# Patient Record
Sex: FEMALE | Race: WHITE | NOT HISPANIC OR LATINO
[De-identification: names, ages, dates, MRNs, and addresses within clinical notes are randomized per-mention and may not be internally consistent; named-entity substitution may affect disease eponyms.]

---

## 2020-03-18 PROBLEM — Z00.00 ENCOUNTER FOR PREVENTIVE HEALTH EXAMINATION: Status: ACTIVE | Noted: 2020-03-18

## 2020-03-19 ENCOUNTER — APPOINTMENT (OUTPATIENT)
Dept: COLORECTAL SURGERY | Facility: CLINIC | Age: 75
End: 2020-03-19
Payer: MEDICARE

## 2020-03-23 ENCOUNTER — APPOINTMENT (OUTPATIENT)
Dept: COLORECTAL SURGERY | Facility: CLINIC | Age: 75
End: 2020-03-23
Payer: MEDICARE

## 2020-03-23 ENCOUNTER — APPOINTMENT (OUTPATIENT)
Dept: COLORECTAL SURGERY | Facility: CLINIC | Age: 75
End: 2020-03-23

## 2020-03-23 VITALS
HEART RATE: 89 BPM | DIASTOLIC BLOOD PRESSURE: 83 MMHG | SYSTOLIC BLOOD PRESSURE: 138 MMHG | WEIGHT: 114 LBS | HEIGHT: 60 IN | BODY MASS INDEX: 22.38 KG/M2 | TEMPERATURE: 97.7 F

## 2020-03-23 DIAGNOSIS — K57.90 DIVERTICULOSIS OF INTESTINE, PART UNSPECIFIED, W/OUT PERFORATION OR ABSCESS W/OUT BLEEDING: ICD-10-CM

## 2020-03-23 DIAGNOSIS — K62.3 RECTAL PROLAPSE: ICD-10-CM

## 2020-03-23 DIAGNOSIS — Z87.39 PERSONAL HISTORY OF OTHER DISEASES OF THE MUSCULOSKELETAL SYSTEM AND CONNECTIVE TISSUE: ICD-10-CM

## 2020-03-23 DIAGNOSIS — K59.09 OTHER CONSTIPATION: ICD-10-CM

## 2020-03-23 PROCEDURE — 99203 OFFICE O/P NEW LOW 30 MIN: CPT

## 2020-03-23 NOTE — CONSULT LETTER
[Dear  ___] : Dear  [unfilled], [Consult Letter:] : I had the pleasure of evaluating your patient, [unfilled]. [Please see my note below.] : Please see my note below. [Consult Closing:] : Thank you very much for allowing me to participate in the care of this patient.  If you have any questions, please do not hesitate to contact me. [Sincerely,] : Sincerely, [FreeTextEntry3] : Aicha Carrillo MD\par

## 2020-03-23 NOTE — PHYSICAL EXAM
[Normal rectal exam] : exam was normal [None] : no anal fissures seen [No Rash or Lesion] : No rash or lesion [Alert] : alert [Oriented to Person] : oriented to person [Oriented to Place] : oriented to place [Oriented to Time] : oriented to time [Calm] : calm [de-identified] : soft, NTND [de-identified] : rectal prolapse, 2 cm noted [de-identified] : NAD [de-identified] : NCAT [de-identified] : PARKER x 4

## 2020-03-23 NOTE — HISTORY OF PRESENT ILLNESS
[FreeTextEntry1] : Ms. Almaraz presents to the office with reports of worsening rectal prolapse associated with constipation. Rectal prolapse was initially noted ~ 1 yr earlier. Over the last month, found that the prolapse is increasing in size and frequency. Prolapse occurs every day when attempting to defecate. Does not occur with cough, sneeze or ambulation. Is always manually reducible, no issues with incarceration. Occasional blood noted.\par BMs are typically passed every 3 days and require straining to evacuate. Since she underwent a colonoscopy 1 week earlier, has been using metamucil daily with good effect. Over the last 2 days, has had BMs daily without much effort though prolapse still occurs. \par Here for surgical consultation as prolapse is affecting quality of life.

## 2020-05-21 ENCOUNTER — OUTPATIENT (OUTPATIENT)
Dept: OUTPATIENT SERVICES | Facility: HOSPITAL | Age: 75
LOS: 1 days | End: 2020-05-21
Payer: MEDICARE

## 2020-05-21 ENCOUNTER — RESULT REVIEW (OUTPATIENT)
Age: 75
End: 2020-05-21

## 2020-05-21 VITALS
WEIGHT: 115.08 LBS | HEIGHT: 58.5 IN | OXYGEN SATURATION: 100 % | SYSTOLIC BLOOD PRESSURE: 145 MMHG | TEMPERATURE: 98 F | DIASTOLIC BLOOD PRESSURE: 82 MMHG | RESPIRATION RATE: 18 BRPM | HEART RATE: 95 BPM

## 2020-05-21 DIAGNOSIS — Z29.9 ENCOUNTER FOR PROPHYLACTIC MEASURES, UNSPECIFIED: ICD-10-CM

## 2020-05-21 DIAGNOSIS — Z01.818 ENCOUNTER FOR OTHER PREPROCEDURAL EXAMINATION: ICD-10-CM

## 2020-05-21 LAB
ANION GAP SERPL CALC-SCNC: 5 MMOL/L — SIGNIFICANT CHANGE UP (ref 5–17)
APTT BLD: 33.7 SEC — SIGNIFICANT CHANGE UP (ref 27.5–36.3)
BASOPHILS # BLD AUTO: 0.04 K/UL — SIGNIFICANT CHANGE UP (ref 0–0.2)
BASOPHILS NFR BLD AUTO: 0.4 % — SIGNIFICANT CHANGE UP (ref 0–2)
BUN SERPL-MCNC: 9 MG/DL — SIGNIFICANT CHANGE UP (ref 7–23)
CALCIUM SERPL-MCNC: 9.7 MG/DL — SIGNIFICANT CHANGE UP (ref 8.5–10.1)
CHLORIDE SERPL-SCNC: 100 MMOL/L — SIGNIFICANT CHANGE UP (ref 96–108)
CO2 SERPL-SCNC: 29 MMOL/L — SIGNIFICANT CHANGE UP (ref 22–31)
CREAT SERPL-MCNC: 0.63 MG/DL — SIGNIFICANT CHANGE UP (ref 0.5–1.3)
EOSINOPHIL # BLD AUTO: 0.07 K/UL — SIGNIFICANT CHANGE UP (ref 0–0.5)
EOSINOPHIL NFR BLD AUTO: 0.7 % — SIGNIFICANT CHANGE UP (ref 0–6)
GLUCOSE SERPL-MCNC: 109 MG/DL — HIGH (ref 70–99)
HCT VFR BLD CALC: 40 % — SIGNIFICANT CHANGE UP (ref 34.5–45)
HGB BLD-MCNC: 13.6 G/DL — SIGNIFICANT CHANGE UP (ref 11.5–15.5)
IMM GRANULOCYTES NFR BLD AUTO: 0.4 % — SIGNIFICANT CHANGE UP (ref 0–1.5)
INR BLD: 1.05 RATIO — SIGNIFICANT CHANGE UP (ref 0.88–1.16)
LYMPHOCYTES # BLD AUTO: 1.17 K/UL — SIGNIFICANT CHANGE UP (ref 1–3.3)
LYMPHOCYTES # BLD AUTO: 11.1 % — LOW (ref 13–44)
MCHC RBC-ENTMCNC: 29 PG — SIGNIFICANT CHANGE UP (ref 27–34)
MCHC RBC-ENTMCNC: 34 GM/DL — SIGNIFICANT CHANGE UP (ref 32–36)
MCV RBC AUTO: 85.3 FL — SIGNIFICANT CHANGE UP (ref 80–100)
MONOCYTES # BLD AUTO: 0.87 K/UL — SIGNIFICANT CHANGE UP (ref 0–0.9)
MONOCYTES NFR BLD AUTO: 8.2 % — SIGNIFICANT CHANGE UP (ref 2–14)
NEUTROPHILS # BLD AUTO: 8.38 K/UL — HIGH (ref 1.8–7.4)
NEUTROPHILS NFR BLD AUTO: 79.2 % — HIGH (ref 43–77)
PLATELET # BLD AUTO: 325 K/UL — SIGNIFICANT CHANGE UP (ref 150–400)
POTASSIUM SERPL-MCNC: 5 MMOL/L — SIGNIFICANT CHANGE UP (ref 3.5–5.3)
POTASSIUM SERPL-SCNC: 5 MMOL/L — SIGNIFICANT CHANGE UP (ref 3.5–5.3)
PROTHROM AB SERPL-ACNC: 11.7 SEC — SIGNIFICANT CHANGE UP (ref 10–12.9)
RBC # BLD: 4.69 M/UL — SIGNIFICANT CHANGE UP (ref 3.8–5.2)
RBC # FLD: 13 % — SIGNIFICANT CHANGE UP (ref 10.3–14.5)
SODIUM SERPL-SCNC: 134 MMOL/L — LOW (ref 135–145)
WBC # BLD: 10.57 K/UL — HIGH (ref 3.8–10.5)
WBC # FLD AUTO: 10.57 K/UL — HIGH (ref 3.8–10.5)

## 2020-05-21 PROCEDURE — 80048 BASIC METABOLIC PNL TOTAL CA: CPT

## 2020-05-21 PROCEDURE — 93005 ELECTROCARDIOGRAM TRACING: CPT

## 2020-05-21 PROCEDURE — 86850 RBC ANTIBODY SCREEN: CPT

## 2020-05-21 PROCEDURE — 85610 PROTHROMBIN TIME: CPT

## 2020-05-21 PROCEDURE — 86900 BLOOD TYPING SEROLOGIC ABO: CPT

## 2020-05-21 PROCEDURE — 71046 X-RAY EXAM CHEST 2 VIEWS: CPT | Mod: 26

## 2020-05-21 PROCEDURE — 36415 COLL VENOUS BLD VENIPUNCTURE: CPT

## 2020-05-21 PROCEDURE — 86901 BLOOD TYPING SEROLOGIC RH(D): CPT

## 2020-05-21 PROCEDURE — 93010 ELECTROCARDIOGRAM REPORT: CPT

## 2020-05-21 PROCEDURE — 85025 COMPLETE CBC W/AUTO DIFF WBC: CPT

## 2020-05-21 PROCEDURE — 85730 THROMBOPLASTIN TIME PARTIAL: CPT

## 2020-05-21 PROCEDURE — 71046 X-RAY EXAM CHEST 2 VIEWS: CPT

## 2020-05-21 PROCEDURE — G0463: CPT | Mod: 25

## 2020-05-21 NOTE — H&P PST ADULT - ASSESSMENT
74 y/o female with rectal prolapsed. Scheduled for Laparoscopic possible open resection rectopexy.   Plan  1. Stop all NSAIDS, herbal supplements and vitamins for 7 days.  2. NPO as per ASU instructions.  3. Use EZ sponges as directed  4. Labs, EKG, CXR as per surgeon  5. PMD visit for optimization prior to surgery as per surgeon  6. COVID test ordered for 2020. Pt instructed to return to [facility] for COVID test.  7. Continue to quarantine at home.     CAPRINI SCORE [CLOT]    AGE RELATED RISK FACTORS                                                       MOBILITY RELATED FACTORS  [ ] Age 41-60 years                                            (1 Point)                  [ ] Bed rest                                                        (1 Point)  [ ] Age: 61-74 years                                           (2 Points)                 [ ] Plaster cast                                                   (2 Points)  [ x ] Age= 75 years                                              (3 Points)                 [ ] Bed bound for more than 72 hours                 (2 Points)    DISEASE RELATED RISK FACTORS                                               GENDER SPECIFIC FACTORS  [ ] Edema in the lower extremities                       (1 Point)                  [ ] Pregnancy                                                     (1 Point)  [ ] Varicose veins                                               (1 Point)                  [ ] Post-partum < 6 weeks                                   (1 Point)             [ ] BMI > 25 Kg/m2                                            (1 Point)                  [ ] Hormonal therapy  or oral contraception          (1 Point)                 [ ] Sepsis (in the previous month)                        (1 Point)                  [ ] History of pregnancy complications                 (1 point)  [ ] Pneumonia or serious lung disease                                               [ ] Unexplained or recurrent                     (1 Point)           (in the previous month)                               (1 Point)  [ ] Abnormal pulmonary function test                     (1 Point)                 SURGERY RELATED RISK FACTORS  [ ] Acute myocardial infarction                              (1 Point)                 [ ]  Section                                             (1 Point)  [ ] Congestive heart failure (in the previous month)  (1 Point)               [ ] Minor surgery                                                  (1 Point)   [ ] Inflammatory bowel disease                             (1 Point)                 [ ] Arthroscopic surgery                                        (2 Points)  [ ] Central venous access                                      (2 Points)                [ x ] General surgery lasting more than 45 minutes   (2 Points)       [ ] Stroke (in the previous month)                          (5 Points)               [ ] Elective arthroplasty                                         (5 Points)     ()  malignancy                                                             (2 points )                                                                                                                                      HEMATOLOGY RELATED FACTORS                                                 TRAUMA RELATED RISK FACTORS  [ ] Prior episodes of VTE                                     (3 Points)                 [ ] Fracture of the hip, pelvis, or leg                       (5 Points)  [ ] Positive family history for VTE                         (3 Points)                 [ ] Acute spinal cord injury (in the previous month)  (5 Points)  [ ] Prothrombin 53443 A                                     (3 Points)                 [ ] Paralysis  (less than 1 month)                             (5 Points)  [ ] Factor V Leiden                                             (3 Points)                  [ ] Multiple Trauma within 1 month                        (5 Points)  [ ] Lupus anticoagulants                                     (3 Points)                                                           [ ] Anticardiolipin antibodies                               (3 Points)                                                       [ ] High homocysteine in the blood                      (3 Points)                                             [ ] Other congenital or acquired thrombophilia      (3 Points)                                                [ ] Heparin induced thrombocytopenia                  (3 Points)    (  ) Malignancy                                        Total Score [   5       ]    The Caprini score indicates this patient is at risk for a VTE event (score 3-5).  Most surgical patients in this group would benefit from pharmacologic prophylaxis.  The surgical team will determine the balance between VTE risk and bleeding risk 76 y/o female with rectal prolapse. Scheduled for Laparoscopic possible open resection rectopexy.   Plan  1. Stop all NSAIDS, herbal supplements and vitamins for 7 days.  2. NPO as per ASU instructions.  3. Use EZ sponges as directed  4. Labs, EKG, CXR as per surgeon  5. PMD visit for optimization prior to surgery as per surgeon  6. COVID test ordered for 2020. Pt instructed to return to [email or web address] for COVID test.  7. Continue to quarantine at home.     CAPRINI SCORE [CLOT]    AGE RELATED RISK FACTORS                                                       MOBILITY RELATED FACTORS  [ ] Age 41-60 years                                            (1 Point)                  [ ] Bed rest                                                        (1 Point)  [ ] Age: 61-74 years                                           (2 Points)                 [ ] Plaster cast                                                   (2 Points)  [ x ] Age= 75 years                                              (3 Points)                 [ ] Bed bound for more than 72 hours                 (2 Points)    DISEASE RELATED RISK FACTORS                                               GENDER SPECIFIC FACTORS  [ ] Edema in the lower extremities                       (1 Point)                  [ ] Pregnancy                                                     (1 Point)  [ ] Varicose veins                                               (1 Point)                  [ ] Post-partum < 6 weeks                                   (1 Point)             [ ] BMI > 25 Kg/m2                                            (1 Point)                  [ ] Hormonal therapy  or oral contraception          (1 Point)                 [ ] Sepsis (in the previous month)                        (1 Point)                  [ ] History of pregnancy complications                 (1 point)  [ ] Pneumonia or serious lung disease                                               [ ] Unexplained or recurrent                     (1 Point)           (in the previous month)                               (1 Point)  [ ] Abnormal pulmonary function test                     (1 Point)                 SURGERY RELATED RISK FACTORS  [ ] Acute myocardial infarction                              (1 Point)                 [ ]  Section                                             (1 Point)  [ ] Congestive heart failure (in the previous month)  (1 Point)               [ ] Minor surgery                                                  (1 Point)   [ ] Inflammatory bowel disease                             (1 Point)                 [ ] Arthroscopic surgery                                        (2 Points)  [ ] Central venous access                                      (2 Points)                [ x ] General surgery lasting more than 45 minutes   (2 Points)       [ ] Stroke (in the previous month)                          (5 Points)               [ ] Elective arthroplasty                                         (5 Points)     ()  malignancy                                                             (2 points )                                                                                                                                      HEMATOLOGY RELATED FACTORS                                                 TRAUMA RELATED RISK FACTORS  [ ] Prior episodes of VTE                                     (3 Points)                 [ ] Fracture of the hip, pelvis, or leg                       (5 Points)  [ ] Positive family history for VTE                         (3 Points)                 [ ] Acute spinal cord injury (in the previous month)  (5 Points)  [ ] Prothrombin 37332 A                                     (3 Points)                 [ ] Paralysis  (less than 1 month)                             (5 Points)  [ ] Factor V Leiden                                             (3 Points)                  [ ] Multiple Trauma within 1 month                        (5 Points)  [ ] Lupus anticoagulants                                     (3 Points)                                                           [ ] Anticardiolipin antibodies                               (3 Points)                                                       [ ] High homocysteine in the blood                      (3 Points)                                             [ ] Other congenital or acquired thrombophilia      (3 Points)                                                [ ] Heparin induced thrombocytopenia                  (3 Points)    (  ) Malignancy                                        Total Score [   5       ]    The Caprini score indicates this patient is at risk for a VTE event (score 3-5).  Most surgical patients in this group would benefit from pharmacologic prophylaxis.  The surgical team will determine the balance between VTE risk and bleeding risk

## 2020-05-21 NOTE — H&P PST ADULT - HISTORY OF PRESENT ILLNESS
74 y/o female with rectal prolapsed. Pt states "my rectum comes out", complain of rectal bleeding, denies pain. Scheduled for Laparoscopic possible open resection rectopexy. 74 y/o female with rectal prolapse. Pt states "my rectum comes out", complain of rectal bleeding, denies pain. Scheduled for Laparoscopic possible open resection rectopexy.

## 2020-05-22 DIAGNOSIS — Z01.818 ENCOUNTER FOR OTHER PREPROCEDURAL EXAMINATION: ICD-10-CM

## 2020-05-26 ENCOUNTER — OUTPATIENT (OUTPATIENT)
Dept: OUTPATIENT SERVICES | Facility: HOSPITAL | Age: 75
LOS: 1 days | End: 2020-05-26
Payer: MEDICARE

## 2020-05-26 DIAGNOSIS — Z11.59 ENCOUNTER FOR SCREENING FOR OTHER VIRAL DISEASES: ICD-10-CM

## 2020-05-26 PROCEDURE — U0003: CPT

## 2020-05-27 DIAGNOSIS — Z11.59 ENCOUNTER FOR SCREENING FOR OTHER VIRAL DISEASES: ICD-10-CM

## 2020-05-27 LAB — SARS-COV-2 RNA SPEC QL NAA+PROBE: DETECTED

## 2020-05-28 ENCOUNTER — APPOINTMENT (OUTPATIENT)
Dept: COLORECTAL SURGERY | Facility: HOSPITAL | Age: 75
End: 2020-05-28

## 2020-06-01 NOTE — H&P PST ADULT - BP NONINVASIVE DIASTOLIC (MM HG)
Attempted to contact patient via phone.  Left message to call back.  Does he want to , mail or fax?   82

## 2020-06-22 PROBLEM — K59.00 CONSTIPATION, UNSPECIFIED: Chronic | Status: ACTIVE | Noted: 2020-05-21

## 2020-06-22 PROBLEM — K62.3 RECTAL PROLAPSE: Chronic | Status: ACTIVE | Noted: 2020-05-21

## 2020-06-24 ENCOUNTER — OUTPATIENT (OUTPATIENT)
Dept: OUTPATIENT SERVICES | Facility: HOSPITAL | Age: 75
LOS: 1 days | End: 2020-06-24
Payer: MEDICARE

## 2020-06-24 VITALS
HEART RATE: 83 BPM | HEIGHT: 59 IN | SYSTOLIC BLOOD PRESSURE: 152 MMHG | OXYGEN SATURATION: 97 % | DIASTOLIC BLOOD PRESSURE: 77 MMHG | TEMPERATURE: 99 F | WEIGHT: 110.89 LBS | RESPIRATION RATE: 14 BRPM

## 2020-06-24 DIAGNOSIS — Z01.818 ENCOUNTER FOR OTHER PREPROCEDURAL EXAMINATION: ICD-10-CM

## 2020-06-24 DIAGNOSIS — K62.3 RECTAL PROLAPSE: ICD-10-CM

## 2020-06-24 LAB
ANION GAP SERPL CALC-SCNC: 5 MMOL/L — SIGNIFICANT CHANGE UP (ref 5–17)
APTT BLD: 32.5 SEC — SIGNIFICANT CHANGE UP (ref 27.5–36.3)
BASOPHILS # BLD AUTO: 0.03 K/UL — SIGNIFICANT CHANGE UP (ref 0–0.2)
BASOPHILS NFR BLD AUTO: 0.5 % — SIGNIFICANT CHANGE UP (ref 0–2)
BUN SERPL-MCNC: 11 MG/DL — SIGNIFICANT CHANGE UP (ref 7–23)
CALCIUM SERPL-MCNC: 9.2 MG/DL — SIGNIFICANT CHANGE UP (ref 8.5–10.1)
CHLORIDE SERPL-SCNC: 103 MMOL/L — SIGNIFICANT CHANGE UP (ref 96–108)
CO2 SERPL-SCNC: 29 MMOL/L — SIGNIFICANT CHANGE UP (ref 22–31)
CREAT SERPL-MCNC: 0.65 MG/DL — SIGNIFICANT CHANGE UP (ref 0.5–1.3)
EOSINOPHIL # BLD AUTO: 0.03 K/UL — SIGNIFICANT CHANGE UP (ref 0–0.5)
EOSINOPHIL NFR BLD AUTO: 0.5 % — SIGNIFICANT CHANGE UP (ref 0–6)
GLUCOSE SERPL-MCNC: 108 MG/DL — HIGH (ref 70–99)
HCT VFR BLD CALC: 41.6 % — SIGNIFICANT CHANGE UP (ref 34.5–45)
HGB BLD-MCNC: 14.2 G/DL — SIGNIFICANT CHANGE UP (ref 11.5–15.5)
IMM GRANULOCYTES NFR BLD AUTO: 0.2 % — SIGNIFICANT CHANGE UP (ref 0–1.5)
INR BLD: 0.99 RATIO — SIGNIFICANT CHANGE UP (ref 0.88–1.16)
LYMPHOCYTES # BLD AUTO: 0.98 K/UL — LOW (ref 1–3.3)
LYMPHOCYTES # BLD AUTO: 15.8 % — SIGNIFICANT CHANGE UP (ref 13–44)
MCHC RBC-ENTMCNC: 29.6 PG — SIGNIFICANT CHANGE UP (ref 27–34)
MCHC RBC-ENTMCNC: 34.1 GM/DL — SIGNIFICANT CHANGE UP (ref 32–36)
MCV RBC AUTO: 86.7 FL — SIGNIFICANT CHANGE UP (ref 80–100)
MONOCYTES # BLD AUTO: 0.52 K/UL — SIGNIFICANT CHANGE UP (ref 0–0.9)
MONOCYTES NFR BLD AUTO: 8.4 % — SIGNIFICANT CHANGE UP (ref 2–14)
NEUTROPHILS # BLD AUTO: 4.62 K/UL — SIGNIFICANT CHANGE UP (ref 1.8–7.4)
NEUTROPHILS NFR BLD AUTO: 74.6 % — SIGNIFICANT CHANGE UP (ref 43–77)
PLATELET # BLD AUTO: 296 K/UL — SIGNIFICANT CHANGE UP (ref 150–400)
POTASSIUM SERPL-MCNC: 4.2 MMOL/L — SIGNIFICANT CHANGE UP (ref 3.5–5.3)
POTASSIUM SERPL-SCNC: 4.2 MMOL/L — SIGNIFICANT CHANGE UP (ref 3.5–5.3)
PROTHROM AB SERPL-ACNC: 11 SEC — SIGNIFICANT CHANGE UP (ref 10–12.9)
RBC # BLD: 4.8 M/UL — SIGNIFICANT CHANGE UP (ref 3.8–5.2)
RBC # FLD: 13.3 % — SIGNIFICANT CHANGE UP (ref 10.3–14.5)
SODIUM SERPL-SCNC: 137 MMOL/L — SIGNIFICANT CHANGE UP (ref 135–145)
WBC # BLD: 6.19 K/UL — SIGNIFICANT CHANGE UP (ref 3.8–10.5)
WBC # FLD AUTO: 6.19 K/UL — SIGNIFICANT CHANGE UP (ref 3.8–10.5)

## 2020-06-24 PROCEDURE — 80048 BASIC METABOLIC PNL TOTAL CA: CPT

## 2020-06-24 PROCEDURE — G0463: CPT | Mod: 25

## 2020-06-24 PROCEDURE — 85610 PROTHROMBIN TIME: CPT

## 2020-06-24 PROCEDURE — 36415 COLL VENOUS BLD VENIPUNCTURE: CPT

## 2020-06-24 PROCEDURE — 86901 BLOOD TYPING SEROLOGIC RH(D): CPT

## 2020-06-24 PROCEDURE — 85025 COMPLETE CBC W/AUTO DIFF WBC: CPT

## 2020-06-24 PROCEDURE — 86850 RBC ANTIBODY SCREEN: CPT

## 2020-06-24 PROCEDURE — 85730 THROMBOPLASTIN TIME PARTIAL: CPT

## 2020-06-24 PROCEDURE — 86900 BLOOD TYPING SEROLOGIC ABO: CPT

## 2020-06-24 NOTE — H&P PST ADULT - HISTORY OF PRESENT ILLNESS
74 y/o female with rectal prolapse. Pt states "my rectum comes out", after having bowel movements. States she uses vaseline to "push it back up". c/o rectal bleeding after BMs. Denies rectal pain. Last PST done 5/21 but surgery cancelled d/t + COVID-19 swab.  Scheduled for Laparoscopic possible open resection rectopexy.

## 2020-06-24 NOTE — H&P PST ADULT - PRIMARY CARE PROVIDER
Dr.Mohseni, [facility]  [phone] Dr.Mohseni, Halah G.  [phone]/ Dr Teresa Cat [phone]

## 2020-06-24 NOTE — H&P PST ADULT - NSICDXPASTMEDICALHX_GEN_ALL_CORE_FT
PAST MEDICAL HISTORY:  Constipation     Rectal bleeding     Rectal prolapse PAST MEDICAL HISTORY:  Constipation     Poor historian     Rectal bleeding     Rectal prolapse

## 2020-06-24 NOTE — H&P PST ADULT - NEGATIVE CARDIOVASCULAR SYMPTOMS
no peripheral edema/no chest pain/no palpitations/no dyspnea on exertion no dyspnea on exertion/no chest pain/no palpitations

## 2020-06-24 NOTE — H&P PST ADULT - MUSCULOSKELETAL
negative No joint pain, swelling or deformity; no limitation of movement detailed exam no calf tenderness/ROM intact/normal strength

## 2020-06-24 NOTE — H&P PST ADULT - ASSESSMENT
74 y/o female with rectal prolapse. Scheduled for Laparoscopic possible open resection rectopexy.   Plan  1. Stop all NSAIDS, herbal supplements and vitamins for 7 days.  2. NPO as per ASU instructions.  3. Use EZ sponges as directed  4. Labs, EKG, CXR as per surgeon  5. PMD visit for optimization prior to surgery as per surgeon  6. COVID test ordered for 2020. Pt instructed to return to [facility] for COVID test.  7. Continue to quarantine at home.     CAPRINI SCORE [CLOT]    AGE RELATED RISK FACTORS                                                       MOBILITY RELATED FACTORS  [ ] Age 41-60 years                                            (1 Point)                  [ ] Bed rest                                                        (1 Point)  [ ] Age: 61-74 years                                           (2 Points)                 [ ] Plaster cast                                                   (2 Points)  [ x ] Age= 75 years                                              (3 Points)                 [ ] Bed bound for more than 72 hours                 (2 Points)    DISEASE RELATED RISK FACTORS                                               GENDER SPECIFIC FACTORS  [ ] Edema in the lower extremities                       (1 Point)                  [ ] Pregnancy                                                     (1 Point)  [ ] Varicose veins                                               (1 Point)                  [ ] Post-partum < 6 weeks                                   (1 Point)             [ ] BMI > 25 Kg/m2                                            (1 Point)                  [ ] Hormonal therapy  or oral contraception          (1 Point)                 [ ] Sepsis (in the previous month)                        (1 Point)                  [ ] History of pregnancy complications                 (1 point)  [ ] Pneumonia or serious lung disease                                               [ ] Unexplained or recurrent                     (1 Point)           (in the previous month)                               (1 Point)  [ ] Abnormal pulmonary function test                     (1 Point)                 SURGERY RELATED RISK FACTORS  [ ] Acute myocardial infarction                              (1 Point)                 [ ]  Section                                             (1 Point)  [ ] Congestive heart failure (in the previous month)  (1 Point)               [ ] Minor surgery                                                  (1 Point)   [ ] Inflammatory bowel disease                             (1 Point)                 [ ] Arthroscopic surgery                                        (2 Points)  [ ] Central venous access                                      (2 Points)                [ x ] General surgery lasting more than 45 minutes   (2 Points)       [ ] Stroke (in the previous month)                          (5 Points)               [ ] Elective arthroplasty                                         (5 Points)     ()  malignancy                                                             (2 points )                                                                                                                                      HEMATOLOGY RELATED FACTORS                                                 TRAUMA RELATED RISK FACTORS  [ ] Prior episodes of VTE                                     (3 Points)                 [ ] Fracture of the hip, pelvis, or leg                       (5 Points)  [ ] Positive family history for VTE                         (3 Points)                 [ ] Acute spinal cord injury (in the previous month)  (5 Points)  [ ] Prothrombin 34078 A                                     (3 Points)                 [ ] Paralysis  (less than 1 month)                             (5 Points)  [ ] Factor V Leiden                                             (3 Points)                  [ ] Multiple Trauma within 1 month                        (5 Points)  [ ] Lupus anticoagulants                                     (3 Points)                                                           [ ] Anticardiolipin antibodies                               (3 Points)                                                       [ ] High homocysteine in the blood                      (3 Points)                                             [ ] Other congenital or acquired thrombophilia      (3 Points)                                                [ ] Heparin induced thrombocytopenia                  (3 Points)    (  ) Malignancy                                        Total Score [   5       ]    The Caprini score indicates this patient is at risk for a VTE event (score 3-5).  Most surgical patients in this group would benefit from pharmacologic prophylaxis.  The surgical team will determine the balance between VTE risk and bleeding risk 76 y/o female with rectal prolapse. Scheduled for Laparoscopic possible open resection rectopexy.   Plan  1. Stop all NSAIDS, herbal supplements and vitamins for 7 days.  2. NPO as per ASU instructions. Colon instruction sheet provided to patient  3. Use EZ sponges as directed  4. , EKG & CXR on chart from 2020  5. PMD visit for optimization prior to surgery as per surgeon  6. COVID test ordered for 2020. Pt instructed to return to [facility] for COVID test. lab test ordered in computer  7. CBC w diff, BMP, PTT/PT/INR, T&S done in [facility]    CAPRINI SCORE [CLOT]    AGE RELATED RISK FACTORS                                                       MOBILITY RELATED FACTORS  [ ] Age 41-60 years                                            (1 Point)                  [ ] Bed rest                                                        (1 Point)  [ ] Age: 61-74 years                                           (2 Points)                 [ ] Plaster cast                                                   (2 Points)  [ x ] Age= 75 years                                              (3 Points)                 [ ] Bed bound for more than 72 hours                 (2 Points)    DISEASE RELATED RISK FACTORS                                               GENDER SPECIFIC FACTORS  [x ] Edema in the lower extremities                       (1 Point)                  [ ] Pregnancy                                                     (1 Point)  [ ] Varicose veins                                               (1 Point)                  [ ] Post-partum < 6 weeks                                   (1 Point)             [ ] BMI > 25 Kg/m2                                            (1 Point)                  [ ] Hormonal therapy  or oral contraception          (1 Point)                 [ ] Sepsis (in the previous month)                        (1 Point)                  [ ] History of pregnancy complications                 (1 point)  [ ] Pneumonia or serious lung disease                                               [ ] Unexplained or recurrent                     (1 Point)           (in the previous month)                               (1 Point)  [ ] Abnormal pulmonary function test                     (1 Point)                 SURGERY RELATED RISK FACTORS  [ ] Acute myocardial infarction                              (1 Point)                 [ ]  Section                                             (1 Point)  [ ] Congestive heart failure (in the previous month)  (1 Point)               [ ] Minor surgery                                                  (1 Point)   [ ] Inflammatory bowel disease                             (1 Point)                 [ ] Arthroscopic surgery                                        (2 Points)  [ ] Central venous access                                      (2 Points)                [ x ] General surgery lasting more than 45 minutes   (2 Points)       [ ] Stroke (in the previous month)                          (5 Points)               [ ] Elective arthroplasty                                         (5 Points)     ()  malignancy                                                             (2 points )                                                                                                                                      HEMATOLOGY RELATED FACTORS                                                 TRAUMA RELATED RISK FACTORS  [ ] Prior episodes of VTE                                     (3 Points)                 [ ] Fracture of the hip, pelvis, or leg                       (5 Points)  [ ] Positive family history for VTE                         (3 Points)                 [ ] Acute spinal cord injury (in the previous month)  (5 Points)  [ ] Prothrombin 16951 A                                     (3 Points)                 [ ] Paralysis  (less than 1 month)                             (5 Points)  [ ] Factor V Leiden                                             (3 Points)                  [ ] Multiple Trauma within 1 month                        (5 Points)  [ ] Lupus anticoagulants                                     (3 Points)                                                           [ ] Anticardiolipin antibodies                               (3 Points)                                                       [ ] High homocysteine in the blood                      (3 Points)                                             [ ] Other congenital or acquired thrombophilia      (3 Points)                                                [ ] Heparin induced thrombocytopenia                  (3 Points)    (  ) Malignancy                                        Total Score [   6       ]    The Caprini score indicates that this patient is at high risk for a VTE event (score => 6).    Ssurgical patients in this group will benefit from both pharmacologic prophylaxis and intermittent compression devices.    The surgical team will determine the balance between VTE risk and bleeding risk, and other clinical considerations.

## 2020-06-24 NOTE — H&P PST ADULT - HEALTH CARE MAINTENANCE
Patient denies travel outside [place] or the state within past 21 days. Denies cold, cough, flu-like symptoms or fever. Denies contact with persons with diagnosed COVID-19/coronavirus.

## 2020-06-25 DIAGNOSIS — Z01.818 ENCOUNTER FOR OTHER PREPROCEDURAL EXAMINATION: ICD-10-CM

## 2020-06-25 DIAGNOSIS — K62.3 RECTAL PROLAPSE: ICD-10-CM

## 2020-06-25 PROBLEM — K62.5 HEMORRHAGE OF ANUS AND RECTUM: Chronic | Status: ACTIVE | Noted: 2020-06-24

## 2020-06-25 PROBLEM — Z78.9 OTHER SPECIFIED HEALTH STATUS: Chronic | Status: ACTIVE | Noted: 2020-06-24

## 2020-07-01 ENCOUNTER — APPOINTMENT (OUTPATIENT)
Dept: COLORECTAL SURGERY | Facility: HOSPITAL | Age: 75
End: 2020-07-01

## 2020-07-03 ENCOUNTER — OUTPATIENT (OUTPATIENT)
Dept: OUTPATIENT SERVICES | Facility: HOSPITAL | Age: 75
LOS: 1 days | End: 2020-07-03
Payer: MEDICARE

## 2020-07-03 DIAGNOSIS — Z11.59 ENCOUNTER FOR SCREENING FOR OTHER VIRAL DISEASES: ICD-10-CM

## 2020-07-03 PROCEDURE — U0003: CPT

## 2020-07-04 ENCOUNTER — OUTPATIENT (OUTPATIENT)
Dept: OUTPATIENT SERVICES | Facility: HOSPITAL | Age: 75
LOS: 1 days | End: 2020-07-04
Payer: MEDICARE

## 2020-07-04 DIAGNOSIS — Z11.59 ENCOUNTER FOR SCREENING FOR OTHER VIRAL DISEASES: ICD-10-CM

## 2020-07-04 LAB
SARS-COV-2 RNA SPEC QL NAA+PROBE: SIGNIFICANT CHANGE UP
SARS-COV-2 RNA SPEC QL NAA+PROBE: SIGNIFICANT CHANGE UP

## 2020-07-04 PROCEDURE — U0003: CPT

## 2020-07-05 DIAGNOSIS — Z11.59 ENCOUNTER FOR SCREENING FOR OTHER VIRAL DISEASES: ICD-10-CM

## 2020-07-06 ENCOUNTER — APPOINTMENT (OUTPATIENT)
Dept: COLORECTAL SURGERY | Facility: HOSPITAL | Age: 75
End: 2020-07-06
Payer: MEDICARE

## 2020-07-06 ENCOUNTER — RESULT REVIEW (OUTPATIENT)
Age: 75
End: 2020-07-06

## 2020-07-06 ENCOUNTER — INPATIENT (INPATIENT)
Facility: HOSPITAL | Age: 75
LOS: 1 days | Discharge: HOME | DRG: 330 | End: 2020-07-08
Attending: COLON & RECTAL SURGERY | Admitting: COLON & RECTAL SURGERY
Payer: MEDICARE

## 2020-07-06 VITALS
SYSTOLIC BLOOD PRESSURE: 14 MMHG | HEART RATE: 108 BPM | TEMPERATURE: 98 F | WEIGHT: 110.01 LBS | DIASTOLIC BLOOD PRESSURE: 74 MMHG | RESPIRATION RATE: 16 BRPM | OXYGEN SATURATION: 97 % | HEIGHT: 59 IN

## 2020-07-06 DIAGNOSIS — K62.3 RECTAL PROLAPSE: ICD-10-CM

## 2020-07-06 DIAGNOSIS — K59.09 OTHER CONSTIPATION: ICD-10-CM

## 2020-07-06 PROCEDURE — 80048 BASIC METABOLIC PNL TOTAL CA: CPT

## 2020-07-06 PROCEDURE — 45400 LAPAROSCOPIC PROC: CPT

## 2020-07-06 PROCEDURE — 85027 COMPLETE CBC AUTOMATED: CPT

## 2020-07-06 PROCEDURE — 71045 X-RAY EXAM CHEST 1 VIEW: CPT | Mod: 26

## 2020-07-06 PROCEDURE — 99222 1ST HOSP IP/OBS MODERATE 55: CPT

## 2020-07-06 PROCEDURE — C1889: CPT

## 2020-07-06 PROCEDURE — 88304 TISSUE EXAM BY PATHOLOGIST: CPT

## 2020-07-06 PROCEDURE — 84100 ASSAY OF PHOSPHORUS: CPT

## 2020-07-06 PROCEDURE — 83735 ASSAY OF MAGNESIUM: CPT

## 2020-07-06 PROCEDURE — 88307 TISSUE EXAM BY PATHOLOGIST: CPT

## 2020-07-06 PROCEDURE — 97116 GAIT TRAINING THERAPY: CPT | Mod: GP

## 2020-07-06 PROCEDURE — 71045 X-RAY EXAM CHEST 1 VIEW: CPT

## 2020-07-06 PROCEDURE — 88307 TISSUE EXAM BY PATHOLOGIST: CPT | Mod: 26

## 2020-07-06 PROCEDURE — 97162 PT EVAL MOD COMPLEX 30 MIN: CPT | Mod: GP

## 2020-07-06 PROCEDURE — 36415 COLL VENOUS BLD VENIPUNCTURE: CPT

## 2020-07-06 PROCEDURE — 88304 TISSUE EXAM BY PATHOLOGIST: CPT | Mod: 26

## 2020-07-06 PROCEDURE — 71045 X-RAY EXAM CHEST 1 VIEW: CPT | Mod: 26,77

## 2020-07-06 PROCEDURE — 97530 THERAPEUTIC ACTIVITIES: CPT | Mod: GP

## 2020-07-06 PROCEDURE — 82962 GLUCOSE BLOOD TEST: CPT

## 2020-07-06 RX ORDER — SODIUM CHLORIDE 9 MG/ML
3 INJECTION INTRAMUSCULAR; INTRAVENOUS; SUBCUTANEOUS EVERY 8 HOURS
Refills: 0 | Status: DISCONTINUED | OUTPATIENT
Start: 2020-07-06 | End: 2020-07-08

## 2020-07-06 RX ORDER — ACETAMINOPHEN 500 MG
975 TABLET ORAL ONCE
Refills: 0 | Status: COMPLETED | OUTPATIENT
Start: 2020-07-06 | End: 2020-07-06

## 2020-07-06 RX ORDER — ONDANSETRON 8 MG/1
4 TABLET, FILM COATED ORAL ONCE
Refills: 0 | Status: DISCONTINUED | OUTPATIENT
Start: 2020-07-06 | End: 2020-07-06

## 2020-07-06 RX ORDER — MORPHINE SULFATE 50 MG/1
2 CAPSULE, EXTENDED RELEASE ORAL EVERY 4 HOURS
Refills: 0 | Status: DISCONTINUED | OUTPATIENT
Start: 2020-07-06 | End: 2020-07-08

## 2020-07-06 RX ORDER — HEPARIN SODIUM 5000 [USP'U]/ML
5000 INJECTION INTRAVENOUS; SUBCUTANEOUS ONCE
Refills: 0 | Status: COMPLETED | OUTPATIENT
Start: 2020-07-06 | End: 2020-07-06

## 2020-07-06 RX ORDER — POLYETHYLENE GLYCOL 3350 17 G/17G
1 POWDER, FOR SOLUTION ORAL
Qty: 0 | Refills: 0 | DISCHARGE

## 2020-07-06 RX ORDER — ACETAMINOPHEN 500 MG
1000 TABLET ORAL ONCE
Refills: 0 | Status: DISCONTINUED | OUTPATIENT
Start: 2020-07-06 | End: 2020-07-06

## 2020-07-06 RX ORDER — ALVIMOPAN 12 MG/1
12 CAPSULE ORAL
Refills: 0 | Status: DISCONTINUED | OUTPATIENT
Start: 2020-07-06 | End: 2020-07-08

## 2020-07-06 RX ORDER — HYDROMORPHONE HYDROCHLORIDE 2 MG/ML
0.5 INJECTION INTRAMUSCULAR; INTRAVENOUS; SUBCUTANEOUS
Refills: 0 | Status: DISCONTINUED | OUTPATIENT
Start: 2020-07-06 | End: 2020-07-06

## 2020-07-06 RX ORDER — FAMOTIDINE 10 MG/ML
20 INJECTION INTRAVENOUS ONCE
Refills: 0 | Status: COMPLETED | OUTPATIENT
Start: 2020-07-06 | End: 2020-07-06

## 2020-07-06 RX ORDER — SODIUM CHLORIDE 9 MG/ML
3 INJECTION INTRAMUSCULAR; INTRAVENOUS; SUBCUTANEOUS EVERY 8 HOURS
Refills: 0 | Status: DISCONTINUED | OUTPATIENT
Start: 2020-07-06 | End: 2020-07-06

## 2020-07-06 RX ORDER — FENTANYL CITRATE 50 UG/ML
50 INJECTION INTRAVENOUS
Refills: 0 | Status: DISCONTINUED | OUTPATIENT
Start: 2020-07-06 | End: 2020-07-06

## 2020-07-06 RX ORDER — HEPARIN SODIUM 5000 [USP'U]/ML
5000 INJECTION INTRAVENOUS; SUBCUTANEOUS EVERY 8 HOURS
Refills: 0 | Status: DISCONTINUED | OUTPATIENT
Start: 2020-07-06 | End: 2020-07-08

## 2020-07-06 RX ORDER — POLYETHYLENE GLYCOL 3350 17 G/17G
17 POWDER, FOR SOLUTION ORAL DAILY
Refills: 0 | Status: DISCONTINUED | OUTPATIENT
Start: 2020-07-06 | End: 2020-07-08

## 2020-07-06 RX ORDER — PSYLLIUM SEED (WITH DEXTROSE)
1 POWDER (GRAM) ORAL
Qty: 0 | Refills: 0 | DISCHARGE

## 2020-07-06 RX ORDER — SODIUM CHLORIDE 9 MG/ML
1000 INJECTION, SOLUTION INTRAVENOUS
Refills: 0 | Status: DISCONTINUED | OUTPATIENT
Start: 2020-07-06 | End: 2020-07-06

## 2020-07-06 RX ORDER — MULTIVIT-MIN/FERROUS GLUCONATE 9 MG/15 ML
1 LIQUID (ML) ORAL
Qty: 0 | Refills: 0 | DISCHARGE

## 2020-07-06 RX ORDER — CEFOTETAN DISODIUM 1 G
1 VIAL (EA) INJECTION EVERY 12 HOURS
Refills: 0 | Status: COMPLETED | OUTPATIENT
Start: 2020-07-06 | End: 2020-07-07

## 2020-07-06 RX ORDER — CEFOTETAN DISODIUM 1 G
VIAL (EA) INJECTION
Refills: 0 | Status: DISCONTINUED | OUTPATIENT
Start: 2020-07-06 | End: 2020-07-06

## 2020-07-06 RX ORDER — OXYCODONE HYDROCHLORIDE 5 MG/1
5 TABLET ORAL ONCE
Refills: 0 | Status: DISCONTINUED | OUTPATIENT
Start: 2020-07-06 | End: 2020-07-06

## 2020-07-06 RX ORDER — ALVIMOPAN 12 MG/1
12 CAPSULE ORAL ONCE
Refills: 0 | Status: COMPLETED | OUTPATIENT
Start: 2020-07-06 | End: 2020-07-06

## 2020-07-06 RX ADMIN — SODIUM CHLORIDE 3 MILLILITER(S): 9 INJECTION INTRAMUSCULAR; INTRAVENOUS; SUBCUTANEOUS at 21:20

## 2020-07-06 RX ADMIN — Medication 975 MILLIGRAM(S): at 06:28

## 2020-07-06 RX ADMIN — ALVIMOPAN 12 MILLIGRAM(S): 12 CAPSULE ORAL at 06:28

## 2020-07-06 RX ADMIN — HYDROMORPHONE HYDROCHLORIDE 0.5 MILLIGRAM(S): 2 INJECTION INTRAMUSCULAR; INTRAVENOUS; SUBCUTANEOUS at 11:52

## 2020-07-06 RX ADMIN — HEPARIN SODIUM 5000 UNIT(S): 5000 INJECTION INTRAVENOUS; SUBCUTANEOUS at 06:27

## 2020-07-06 RX ADMIN — FAMOTIDINE 20 MILLIGRAM(S): 10 INJECTION INTRAVENOUS at 06:28

## 2020-07-06 RX ADMIN — Medication 100 GRAM(S): at 18:01

## 2020-07-06 RX ADMIN — HYDROMORPHONE HYDROCHLORIDE 0.5 MILLIGRAM(S): 2 INJECTION INTRAMUSCULAR; INTRAVENOUS; SUBCUTANEOUS at 12:02

## 2020-07-06 RX ADMIN — ALVIMOPAN 12 MILLIGRAM(S): 12 CAPSULE ORAL at 18:01

## 2020-07-06 RX ADMIN — HEPARIN SODIUM 5000 UNIT(S): 5000 INJECTION INTRAVENOUS; SUBCUTANEOUS at 21:19

## 2020-07-06 RX ADMIN — MORPHINE SULFATE 2 MILLIGRAM(S): 50 CAPSULE, EXTENDED RELEASE ORAL at 23:45

## 2020-07-06 NOTE — CONSULT NOTE ADULT - SUBJECTIVE AND OBJECTIVE BOX
CC- rectal prolapse    HPI:  74yo/F with PMH rectal prolapse presented for surgical correction. Patient states her rectum has been falling out and she had blood in stool. SHe underwent lap sigmoidectomy with rectopexy. Medical consult called for medical management postop    PMH- as above  PSH- denies  Soc hx- denies smoking, no alcohol  Fam hx- both parents     Review of system- All 10 systems reviewed and is as per HPI otherwise negative.     T(C): 36.6 (20 @ 14:17), Max: 36.9 (20 @ 13:51)  HR: 90 (20 @ 14:17) (88 - 108)  BP: 136/67 (20 @ 14:17) (14 - 161/74)  RR: 16 (20 @ 14:17) (12 - 20)  SpO2: 96% (20 @ 14:17) (96% - 100%)  Wt(kg): --    LABS:    CAPILLARY BLOOD GLUCOSE  POCT Blood Glucose.: 134 mg/dL (2020 11:50)  POCT Blood Glucose.: 104 mg/dL (2020 07:56)  POCT Blood Glucose.: 116 mg/dL (2020 06:38)    RADIOLOGY & ADDITIONAL TESTS:    PHYSICAL EXAM:  GENERAL: NAD, well-groomed, well-developed  HEAD:  Atraumatic, Normocephalic  EYES: EOMI, PERRLA, conjunctiva and sclera clear  HEENT: Moist mucous membranes  NECK: Supple, No JVD  NERVOUS SYSTEM:  Alert & Oriented X3, Motor Strength 5/5 B/L upper and lower extremities; DTRs 2+ intact and symmetric  CHEST/LUNG: Clear to auscultation bilaterally; No rales, rhonchi, wheezing, or rubs  HEART: Regular rate and rhythm; No murmurs, rubs, or gallops  ABDOMEN: Postop, soft  GENITOURINARY- Piña   EXTREMITIES:  2+ Peripheral Pulses, No clubbing, cyanosis, or edema  MUSCULOSKELTAL- No muscle tenderness, Muscle tone normal, No joint tenderness, no Joint swelling, Joint range of motion-normal  SKIN-no rash, no lesion  CNS- alert, oriented X3, non focal     Daily Height in cm: 149.86 (2020 06:12)      alvimopan 12 milliGRAM(s) Oral two times a day  cefoTEtan  IVPB 1 Gram(s) IV Intermittent every 12 hours  heparin   Injectable 5000 Unit(s) SubCutaneous every 8 hours  morphine  - Injectable 2 milliGRAM(s) IV Push every 4 hours PRN  polyethylene glycol 3350 17 Gram(s) Oral daily  sodium chloride 0.9% lock flush 3 milliLiter(s) IV Push every 8 hours    Assessment/Plan  #Rectal prolapse s/p rectopexy  CRS f/u appreciated  Pariop abx per protocol  Pain meds prn  On Entereg  Piña in  Incentive spirometry  OOB to ambulate    #DVT proph- heparin SQ    #Dispo- thank you for consult, will follow with you

## 2020-07-07 LAB
ANION GAP SERPL CALC-SCNC: 8 MMOL/L — SIGNIFICANT CHANGE UP (ref 5–17)
BUN SERPL-MCNC: 7 MG/DL — SIGNIFICANT CHANGE UP (ref 7–23)
CALCIUM SERPL-MCNC: 8.5 MG/DL — SIGNIFICANT CHANGE UP (ref 8.5–10.1)
CHLORIDE SERPL-SCNC: 100 MMOL/L — SIGNIFICANT CHANGE UP (ref 96–108)
CO2 SERPL-SCNC: 25 MMOL/L — SIGNIFICANT CHANGE UP (ref 22–31)
CREAT SERPL-MCNC: 0.95 MG/DL — SIGNIFICANT CHANGE UP (ref 0.5–1.3)
GLUCOSE SERPL-MCNC: 135 MG/DL — HIGH (ref 70–99)
HCT VFR BLD CALC: 36.7 % — SIGNIFICANT CHANGE UP (ref 34.5–45)
HGB BLD-MCNC: 12.7 G/DL — SIGNIFICANT CHANGE UP (ref 11.5–15.5)
MCHC RBC-ENTMCNC: 29.5 PG — SIGNIFICANT CHANGE UP (ref 27–34)
MCHC RBC-ENTMCNC: 34.6 GM/DL — SIGNIFICANT CHANGE UP (ref 32–36)
MCV RBC AUTO: 85.3 FL — SIGNIFICANT CHANGE UP (ref 80–100)
PLATELET # BLD AUTO: 277 K/UL — SIGNIFICANT CHANGE UP (ref 150–400)
POTASSIUM SERPL-MCNC: 3.5 MMOL/L — SIGNIFICANT CHANGE UP (ref 3.5–5.3)
POTASSIUM SERPL-SCNC: 3.5 MMOL/L — SIGNIFICANT CHANGE UP (ref 3.5–5.3)
RBC # BLD: 4.3 M/UL — SIGNIFICANT CHANGE UP (ref 3.8–5.2)
RBC # FLD: 13.3 % — SIGNIFICANT CHANGE UP (ref 10.3–14.5)
SODIUM SERPL-SCNC: 133 MMOL/L — LOW (ref 135–145)
WBC # BLD: 11.9 K/UL — HIGH (ref 3.8–10.5)
WBC # FLD AUTO: 11.9 K/UL — HIGH (ref 3.8–10.5)

## 2020-07-07 PROCEDURE — 99232 SBSQ HOSP IP/OBS MODERATE 35: CPT

## 2020-07-07 RX ADMIN — Medication 100 GRAM(S): at 05:48

## 2020-07-07 RX ADMIN — HEPARIN SODIUM 5000 UNIT(S): 5000 INJECTION INTRAVENOUS; SUBCUTANEOUS at 13:37

## 2020-07-07 RX ADMIN — SODIUM CHLORIDE 3 MILLILITER(S): 9 INJECTION INTRAMUSCULAR; INTRAVENOUS; SUBCUTANEOUS at 05:49

## 2020-07-07 RX ADMIN — ALVIMOPAN 12 MILLIGRAM(S): 12 CAPSULE ORAL at 21:16

## 2020-07-07 RX ADMIN — MORPHINE SULFATE 2 MILLIGRAM(S): 50 CAPSULE, EXTENDED RELEASE ORAL at 06:02

## 2020-07-07 RX ADMIN — HEPARIN SODIUM 5000 UNIT(S): 5000 INJECTION INTRAVENOUS; SUBCUTANEOUS at 05:46

## 2020-07-07 RX ADMIN — HEPARIN SODIUM 5000 UNIT(S): 5000 INJECTION INTRAVENOUS; SUBCUTANEOUS at 21:16

## 2020-07-07 RX ADMIN — SODIUM CHLORIDE 3 MILLILITER(S): 9 INJECTION INTRAMUSCULAR; INTRAVENOUS; SUBCUTANEOUS at 13:25

## 2020-07-07 RX ADMIN — SODIUM CHLORIDE 3 MILLILITER(S): 9 INJECTION INTRAMUSCULAR; INTRAVENOUS; SUBCUTANEOUS at 21:16

## 2020-07-07 RX ADMIN — ALVIMOPAN 12 MILLIGRAM(S): 12 CAPSULE ORAL at 05:46

## 2020-07-07 NOTE — PHYSICAL THERAPY INITIAL EVALUATION ADULT - PERTINENT HX OF CURRENT PROBLEM, REHAB EVAL
74 y/o female with rectal prolapse. Pt states "my rectum comes out", complain of rectal bleeding, denies pain. Scheduled for Laparoscopic possible open resection rectopexy.

## 2020-07-07 NOTE — PROGRESS NOTE ADULT - SUBJECTIVE AND OBJECTIVE BOX
CC- rectal prolapse    HPI:  76yo/F with PMH rectal prolapse presented for surgical correction. Patient states her rectum has been falling out and she had blood in stool. SHe underwent lap sigmoidectomy with rectopexy. Medical consult called for medical management postop    PMH- as above  PSH- denies  Soc hx- denies smoking, no alcohol  Fam hx- both parents     20- ambulating, +flatus. Hungry and asking for solid meals    Review of system- All 10 systems reviewed and is as per HPI otherwise negative.     Vital Signs Last 24 Hrs  T(C): 36.7 (2020 09:06), Max: 37 (2020 05:20)  T(F): 98.1 (2020 09:06), Max: 98.6 (2020 05:20)  HR: 93 (2020 09:06) (78 - 101)  BP: 129/87 (2020 09:06) (127/63 - 150/63)  BP(mean): --  RR: 16 (2020 09:06) (16 - 16)  SpO2: 98% (2020 09:06) (95% - 98%)    LABS:                        12.7   11.90 )-----------( 277      ( 2020 09:21 )             36.7     2020 09:21    133    |  100    |  7      ----------------------------<  135    3.5     |  25     |  0.95     Ca    8.5        2020 09:21    RADIOLOGY & ADDITIONAL TESTS:    PHYSICAL EXAM:  GENERAL: NAD, well-groomed, well-developed  HEAD:  Atraumatic, Normocephalic  EYES: EOMI, PERRLA, conjunctiva and sclera clear  HEENT: Moist mucous membranes  NECK: Supple, No JVD  NERVOUS SYSTEM:  Alert & Oriented X3, Motor Strength 5/5 B/L upper and lower extremities; DTRs 2+ intact and symmetric  CHEST/LUNG: Clear to auscultation bilaterally; No rales, rhonchi, wheezing, or rubs  HEART: Regular rate and rhythm; No murmurs, rubs, or gallops  ABDOMEN: Postop, soft  GENITOURINARY- voiding  EXTREMITIES:  2+ Peripheral Pulses, No clubbing, cyanosis, or edema  MUSCULOSKELTAL- No muscle tenderness, Muscle tone normal, No joint tenderness, no Joint swelling, Joint range of motion-normal  SKIN-no rash, no lesion  CNS- alert, oriented X3, non focal     Daily Height in cm: 149.86 (2020 06:12)      MEDICATIONS  (STANDING):  alvimopan 12 milliGRAM(s) Oral two times a day  heparin   Injectable 5000 Unit(s) SubCutaneous every 8 hours  polyethylene glycol 3350 17 Gram(s) Oral daily  sodium chloride 0.9% lock flush 3 milliLiter(s) IV Push every 8 hours    MEDICATIONS  (PRN):  morphine  - Injectable 2 milliGRAM(s) IV Push every 4 hours PRN Severe Pain (7 - 10)    Assessment/Plan  #Rectal prolapse s/p rectopexy  CRS f/u appreciated  Pariop abx per protocol  Pain meds prn  On Entereg  Piña removed, voiding  Incentive spirometry  Ambulating  Diet- full liquids, advance per CRS    #DVT proph- heparin SQ    #Dispo- advance diet per CRS. Ambulate. Likely home in 1-2 days

## 2020-07-07 NOTE — PHYSICAL THERAPY INITIAL EVALUATION ADULT - DISCHARGE DISPOSITION, PT EVAL
home/No Need for continued skilled Acute Care PT services at present, the pt should ambulate under nursing supervision.

## 2020-07-07 NOTE — PHYSICAL THERAPY INITIAL EVALUATION ADULT - MODALITIES TREATMENT COMMENTS
The pt was returned to supine and adjusted for comfort in bed, the pt was in NAD at end of tx, CB, tray and phone in place. Bed alarm secured.

## 2020-07-07 NOTE — PROGRESS NOTE ADULT - ASSESSMENT
Pt is a 75 year old female s/p lap sigmoidectomy and rectopexy, POD1    -Monitor vitals  -Diet: CLD   -Pain control prn  -Anti emetic prn  -Continue IVF  -Continue IVAbx  -Serial abd exams  -Monitor daily labs   -Encourage oob/ambulation  -Encourage IS use  -DVT/GI ppx    Will discuss with Dr. Carrillo, colorectal surgery attending Pt is a 75 year old female s/p lap sigmoidectomy and rectopexy, POD1    -Monitor vitals  -Diet: FLD  -Pain control prn  -Anti emetic prn  -Continue IVF  -Continue IVAbx  -Serial abd exams  -Monitor daily labs   -Remove miranda; void check in 6-8hrs  -Encourage oob/ambulation  -Encourage IS use  -DVT/GI ppx    Will discuss with Dr. Carrillo, colorectal surgery attending

## 2020-07-07 NOTE — PHYSICAL THERAPY INITIAL EVALUATION ADULT - GENERAL OBSERVATIONS, REHAB EVAL
The pt was pleasant and cooperative and eager to ambulate with PT, the pt was received standing in the bathroom under nursing care.

## 2020-07-07 NOTE — PROGRESS NOTE ADULT - SUBJECTIVE AND OBJECTIVE BOX
Pt was seen and examined at bedside this morning with surgery team. No acute overnight events reported by nursing staff. Pt offers no new complaints at this time. Denies fever/cp/sob/n/v/d/c. Vitals stable.     T(C): 37 (07-07-20 @ 05:20), Max: 37 (07-07-20 @ 05:20)  HR: 101 (07-07-20 @ 05:20) (78 - 101)  BP: 150/63 (07-07-20 @ 05:20) (127/63 - 161/74)  RR: 16 (07-07-20 @ 05:20) (12 - 20)  SpO2: 95% (07-07-20 @ 05:20) (95% - 100%)    PHYSICAL EXAM:  Constitutional: NAD, GCS: 15/15  AOX3  Eyes:  WNL  ENMT:  WNL  Neck:  WNL, non tender  Back: Non tender  Respiratory: CTABL  Cardiovascular:  S1+S2+0  Gastrointestinal: Soft, ND , NT  Genitourinary:  WNL  Extremities: NV intact  Vascular:  Intact  Neurological: No focal neurological deficit,  CN, motor and sensory system grossly intact.  Skin: WNL  Musculoskeletal: WNL  Psychiatric: Grossly WNL Pt was seen and examined at bedside this morning with surgery team. No acute overnight events reported by nursing staff. Pt offers no new complaints at this time. Denies fever/cp/sob/n/v/d/c. Vitals stable.     T(C): 37 (07-07-20 @ 05:20), Max: 37 (07-07-20 @ 05:20)  HR: 101 (07-07-20 @ 05:20) (78 - 101)  BP: 150/63 (07-07-20 @ 05:20) (127/63 - 161/74)  RR: 16 (07-07-20 @ 05:20) (12 - 20)  SpO2: 95% (07-07-20 @ 05:20) (95% - 100%)    PHYSICAL EXAM:  Constitutional: NAD, GCS: 15/15  AOX3  Eyes:  WNL  ENMT:  WNL  Neck:  WNL, non tender  Back: Non tender  Respiratory: CTABL  Cardiovascular:  S1+S2+0  Gastrointestinal: Soft, ND , NT  Genitourinary:  WNL  Extremities: NV intact  Vascular:  Intact  Neurological: No focal neurological deficit,  CN, motor and sensory system grossly intact.  Skin: WNL  Musculoskeletal: WNL  Psychiatric: Grossly WNL    --------------------------------------------------------  IN:    lactated ringers.: 168 mL    Other: 1250 mL  Total IN: 1418 mL    OUT:    Indwelling Catheter - Urethral: 2305 mL  Total OUT: 2305 mL    Total NET: -887 mL    07-07 @ 09:21                    12.7  CBC: 11.90>)-------(<277                     36.7      Current Inpatient Medications:  alvimopan 12 milliGRAM(s) Oral two times a day  heparin   Injectable 5000 Unit(s) SubCutaneous every 8 hours  morphine  - Injectable 2 milliGRAM(s) IV Push every 4 hours PRN  polyethylene glycol 3350 17 Gram(s) Oral daily  sodium chloride 0.9% lock flush 3 milliLiter(s) IV Push every 8 hours

## 2020-07-08 ENCOUNTER — TRANSCRIPTION ENCOUNTER (OUTPATIENT)
Age: 75
End: 2020-07-08

## 2020-07-08 VITALS
DIASTOLIC BLOOD PRESSURE: 71 MMHG | OXYGEN SATURATION: 100 % | TEMPERATURE: 98 F | RESPIRATION RATE: 16 BRPM | HEART RATE: 89 BPM | SYSTOLIC BLOOD PRESSURE: 156 MMHG

## 2020-07-08 LAB
ANION GAP SERPL CALC-SCNC: 6 MMOL/L — SIGNIFICANT CHANGE UP (ref 5–17)
BUN SERPL-MCNC: 6 MG/DL — LOW (ref 7–23)
CALCIUM SERPL-MCNC: 8.9 MG/DL — SIGNIFICANT CHANGE UP (ref 8.5–10.1)
CHLORIDE SERPL-SCNC: 101 MMOL/L — SIGNIFICANT CHANGE UP (ref 96–108)
CO2 SERPL-SCNC: 28 MMOL/L — SIGNIFICANT CHANGE UP (ref 22–31)
CREAT SERPL-MCNC: 0.7 MG/DL — SIGNIFICANT CHANGE UP (ref 0.5–1.3)
GLUCOSE SERPL-MCNC: 122 MG/DL — HIGH (ref 70–99)
HCT VFR BLD CALC: 39.1 % — SIGNIFICANT CHANGE UP (ref 34.5–45)
HGB BLD-MCNC: 13.3 G/DL — SIGNIFICANT CHANGE UP (ref 11.5–15.5)
MAGNESIUM SERPL-MCNC: 2.3 MG/DL — SIGNIFICANT CHANGE UP (ref 1.6–2.6)
MCHC RBC-ENTMCNC: 28.9 PG — SIGNIFICANT CHANGE UP (ref 27–34)
MCHC RBC-ENTMCNC: 34 GM/DL — SIGNIFICANT CHANGE UP (ref 32–36)
MCV RBC AUTO: 84.8 FL — SIGNIFICANT CHANGE UP (ref 80–100)
PHOSPHATE SERPL-MCNC: 1.8 MG/DL — LOW (ref 2.5–4.5)
PLATELET # BLD AUTO: 299 K/UL — SIGNIFICANT CHANGE UP (ref 150–400)
POTASSIUM SERPL-MCNC: 3.4 MMOL/L — LOW (ref 3.5–5.3)
POTASSIUM SERPL-SCNC: 3.4 MMOL/L — LOW (ref 3.5–5.3)
RBC # BLD: 4.61 M/UL — SIGNIFICANT CHANGE UP (ref 3.8–5.2)
RBC # FLD: 13.5 % — SIGNIFICANT CHANGE UP (ref 10.3–14.5)
SODIUM SERPL-SCNC: 135 MMOL/L — SIGNIFICANT CHANGE UP (ref 135–145)
WBC # BLD: 8.49 K/UL — SIGNIFICANT CHANGE UP (ref 3.8–10.5)
WBC # FLD AUTO: 8.49 K/UL — SIGNIFICANT CHANGE UP (ref 3.8–10.5)

## 2020-07-08 PROCEDURE — 99232 SBSQ HOSP IP/OBS MODERATE 35: CPT

## 2020-07-08 RX ORDER — POTASSIUM CHLORIDE 20 MEQ
40 PACKET (EA) ORAL ONCE
Refills: 0 | Status: COMPLETED | OUTPATIENT
Start: 2020-07-08 | End: 2020-07-08

## 2020-07-08 RX ORDER — SODIUM,POTASSIUM PHOSPHATES 278-250MG
2 POWDER IN PACKET (EA) ORAL ONCE
Refills: 0 | Status: COMPLETED | OUTPATIENT
Start: 2020-07-08 | End: 2020-07-08

## 2020-07-08 RX ADMIN — Medication 2 PACKET(S): at 09:43

## 2020-07-08 RX ADMIN — Medication 40 MILLIEQUIVALENT(S): at 09:42

## 2020-07-08 RX ADMIN — SODIUM CHLORIDE 3 MILLILITER(S): 9 INJECTION INTRAMUSCULAR; INTRAVENOUS; SUBCUTANEOUS at 05:36

## 2020-07-08 RX ADMIN — ALVIMOPAN 12 MILLIGRAM(S): 12 CAPSULE ORAL at 09:42

## 2020-07-08 RX ADMIN — HEPARIN SODIUM 5000 UNIT(S): 5000 INJECTION INTRAVENOUS; SUBCUTANEOUS at 05:36

## 2020-07-08 NOTE — DISCHARGE NOTE NURSING/CASE MANAGEMENT/SOCIAL WORK - PATIENT PORTAL LINK FT
You can access the FollowMyHealth Patient Portal offered by [facility] by registering at the following website: [email or web address]. By joining [employer]’s FollowMyHealth portal, you will also be able to view your health information using other applications (apps) compatible with our system.

## 2020-07-08 NOTE — DISCHARGE NOTE PROVIDER - HOSPITAL COURSE
lap sigmoidectomy for redundant colon    rectopexy    Postop course uneventful, with return of bowel function and pain control ,tolerating diet

## 2020-07-08 NOTE — PROGRESS NOTE ADULT - SUBJECTIVE AND OBJECTIVE BOX
CC- rectal prolapse    HPI:  76yo/F with PMH rectal prolapse presented for surgical correction. Patient states her rectum has been falling out and she had blood in stool. SHe underwent lap sigmoidectomy with rectopexy. Medical consult called for medical management postop    PMH- as above  PSH- denies  Soc hx- denies smoking, no alcohol  Fam hx- both parents     20- ambulating, +flatus. Hungry and asking for solid meals  20 seen earlier today. Doing good. Diet advanced and tolerated well    Review of system- All 10 systems reviewed and is as per HPI otherwise negative.     Vital Signs Last 24 Hrs  T(C): 36.6 (2020 09:04), Max: 36.9 (2020 00:24)  T(F): 97.9 (2020 09:04), Max: 98.5 (2020 00:24)  HR: 89 (2020 09:04) (89 - 97)  BP: 156/71 (2020 09:04) (137/70 - 156/71)  BP(mean): --  RR: 16 (2020 09:04) (16 - 16)  SpO2: 100% (2020 09:04) (100% - 100%)    LABS:                        13.3   8.49  )-----------( 299      ( 2020 06:37 )             39.1     2020 06:37    135    |  101    |  6      ----------------------------<  122    3.4     |  28     |  0.70     Ca    8.9        2020 06:37  Phos  1.8       2020 06:37  Mg     2.3       2020 06:37    CAPILLARY BLOOD GLUCOSE  POCT Blood Glucose.: 108 mg/dL (2020 05:40)  POCT Blood Glucose.: 126 mg/dL (2020 21:21)    RADIOLOGY & ADDITIONAL TESTS:    PHYSICAL EXAM:  GENERAL: NAD, well-groomed, well-developed  HEAD:  Atraumatic, Normocephalic  EYES: EOMI, PERRLA, conjunctiva and sclera clear  HEENT: Moist mucous membranes  NECK: Supple, No JVD  NERVOUS SYSTEM:  Alert & Oriented X3, Motor Strength 5/5 B/L upper and lower extremities; DTRs 2+ intact and symmetric  CHEST/LUNG: Clear to auscultation bilaterally; No rales, rhonchi, wheezing, or rubs  HEART: Regular rate and rhythm; No murmurs, rubs, or gallops  ABDOMEN: Postop, soft, +BS  GENITOURINARY- voiding  EXTREMITIES:  2+ Peripheral Pulses, No clubbing, cyanosis, or edema  MUSCULOSKELTAL- No muscle tenderness, Muscle tone normal, No joint tenderness, no Joint swelling, Joint range of motion-normal  SKIN-no rash, no lesion  CNS- alert, oriented X3, non focal     Daily Height in cm: 149.86 (2020 06:12)      MEDICATIONS  (STANDING):  alvimopan 12 milliGRAM(s) Oral two times a day  heparin   Injectable 5000 Unit(s) SubCutaneous every 8 hours  polyethylene glycol 3350 17 Gram(s) Oral daily  sodium chloride 0.9% lock flush 3 milliLiter(s) IV Push every 8 hours    MEDICATIONS  (PRN):  morphine  - Injectable 2 milliGRAM(s) IV Push every 4 hours PRN Severe Pain (7 - 10)    Assessment/Plan  #Rectal prolapse s/p rectopexy  CRS f/u appreciated  Pain meds prn  On Entereg  Piña removed, voiding  Incentive spirometry  Ambulating  Diet advanced, tolerated well  +Flatus. +BM    #DVT proph- heparin SQ    #Dispo- stable for discharge home today. D/w pt

## 2020-07-08 NOTE — DISCHARGE NOTE PROVIDER - NSDCACTIVITY_GEN_ALL_CORE
Walking - Outdoors allowed/Driving allowed/Showering allowed/No heavy lifting/straining/Stairs allowed/Walking - Indoors allowed

## 2020-07-08 NOTE — DISCHARGE NOTE PROVIDER - NSDCMRMEDTOKEN_GEN_ALL_CORE_FT
Daily Multiple for Women 50+ oral tablet: 1 tab(s) orally once a day    hold 7 days preop  Metamucil 3.4 g/3.7 g oral powder for reconstitution: 1 dose(s) orally once a day  MiraLax oral powder for reconstitution: 1 dose(s) orally once a day  Pepto-Bismol 262 mg oral tablet, chewable: 2 tab(s) orally 4 times a day

## 2020-07-10 DIAGNOSIS — Q43.8 OTHER SPECIFIED CONGENITAL MALFORMATIONS OF INTESTINE: ICD-10-CM

## 2020-07-10 DIAGNOSIS — K62.3 RECTAL PROLAPSE: ICD-10-CM

## 2020-07-23 ENCOUNTER — APPOINTMENT (OUTPATIENT)
Dept: COLORECTAL SURGERY | Facility: CLINIC | Age: 75
End: 2020-07-23
Payer: MEDICARE

## 2020-07-23 VITALS
OXYGEN SATURATION: 97 % | SYSTOLIC BLOOD PRESSURE: 126 MMHG | HEART RATE: 96 BPM | WEIGHT: 111 LBS | BODY MASS INDEX: 23.3 KG/M2 | DIASTOLIC BLOOD PRESSURE: 84 MMHG | TEMPERATURE: 98 F | HEIGHT: 58 IN

## 2020-07-23 DIAGNOSIS — Z09 ENCOUNTER FOR FOLLOW-UP EXAMINATION AFTER COMPLETED TREATMENT FOR CONDITIONS OTHER THAN MALIGNANT NEOPLASM: ICD-10-CM

## 2020-07-23 PROCEDURE — 99024 POSTOP FOLLOW-UP VISIT: CPT

## 2020-07-23 RX ORDER — LORATADINE 10 MG
17 TABLET,DISINTEGRATING ORAL
Refills: 0 | Status: ACTIVE | COMMUNITY

## 2020-07-23 RX ORDER — METRONIDAZOLE 500 MG/1
500 TABLET ORAL
Qty: 3 | Refills: 0 | Status: DISCONTINUED | COMMUNITY
Start: 2020-05-14 | End: 2020-07-23

## 2020-07-23 RX ORDER — PSYLLIUM HUSK 0.4 G
CAPSULE ORAL
Refills: 0 | Status: ACTIVE | COMMUNITY

## 2020-07-23 RX ORDER — NEOMYCIN SULFATE 500 MG/1
500 TABLET ORAL
Qty: 6 | Refills: 0 | Status: DISCONTINUED | COMMUNITY
Start: 2020-05-14 | End: 2020-07-23

## 2020-07-23 NOTE — ASSESSMENT
[FreeTextEntry1] : Ms. Almaraz returns to the office for a postoperative visit.  Overall, she is doing very well.  Her  is concerned that she is returning to her prior habits of sitting on the toilet excessively when there is no real stool to evacuate, and predisposing herself to a recurrence.  She admits to doing so, but reports that this is in response to a sensation of rectal pressure.  The rectal pressure that she is sensing is likely a consequence of chronic rectal prolapse as well as because of postoperative inflammation.  I agreed with her 's concern re: return to old habits  and have advised her to develop a new bowel regimen in which she can utilize the toilet in the morning, evacuate, and the remainder of the day, can practice strengthening her sphincter muscles to help control her stools.  She was advised to continue with Metamucil and a high-fiber diet, but at this point, can reduce or even remove MiraLAX from her regimen.  She was reminded that it will take some time for her to regain sphincter tone and control given the chronicity of her prolapse.  She does not need to return to the office for routine follow-up appointment, but can return to see me for any additional colorectal issues that may arise.

## 2020-07-23 NOTE — HISTORY OF PRESENT ILLNESS
[FreeTextEntry1] : Ms. Almaraz presents to the office for a postoperative visit, accompanied by her .  Overall, she reports doing fairly well.  Her 's main complaint is that she uses the toilet excessively to try to evacuate her stools.  He states that it is up to 50 times a day.  She denies actually passing stools each of those attempts, but reports rectal pressure which then leads her to sit on the toilet bowl.  Her  is concerned that this will lead to a recurrence of her rectal prolapse.  In general, since the surgery, the stools have been soft and easy to pass.  She reports that the stool consistency was initially diarrheal, but has since improved and is now more formed.  She denies any recurrence of the prolapse.  There have been occasional accidents with stool incontinence but admittedly not very many.  She continues to use Metamucil and is also using MiraLAX twice daily. 156

## 2020-07-23 NOTE — PHYSICAL EXAM
[No Rash or Lesion] : No rash or lesion [Alert] : alert [Oriented to Person] : oriented to person [Oriented to Place] : oriented to place [Calm] : calm [Oriented to Time] : oriented to time [de-identified] : No apparent distress [de-identified] : Incision clean dry and intact, soft, nontender nondistended [de-identified] : Normocephalic atraumatic [de-identified] : Moving all extremities x4

## 2020-12-14 ENCOUNTER — APPOINTMENT (OUTPATIENT)
Dept: COLORECTAL SURGERY | Facility: CLINIC | Age: 75
End: 2020-12-14
Payer: MEDICARE

## 2020-12-14 PROCEDURE — 46221 LIGATION OF HEMORRHOID(S): CPT

## 2020-12-14 PROCEDURE — 99214 OFFICE O/P EST MOD 30 MIN: CPT | Mod: 25

## 2020-12-14 PROCEDURE — 99072 ADDL SUPL MATRL&STAF TM PHE: CPT

## 2020-12-14 RX ORDER — ATORVASTATIN CALCIUM 10 MG/1
10 TABLET, FILM COATED ORAL
Qty: 90 | Refills: 0 | Status: ACTIVE | COMMUNITY
Start: 2020-09-18

## 2020-12-14 NOTE — ASSESSMENT
[FreeTextEntry1] : Ms. Almaraz returns to the office for a postoperative visit.  Overall, she is doing very well.  Her  is concerned that she is returning to her prior habits of sitting on the toilet excessively when there is no real stool to evacuate, and predisposing herself to a recurrence.  She admits to doing so, but reports that this is in response to a sensation of rectal pressure.  The rectal pressure that she is sensing is likely a consequence of chronic rectal prolapse as well as because of postoperative inflammation.  I agreed with her 's concern re: return to old habits  and have advised her to develop a new bowel regimen in which she can utilize the toilet in the morning, evacuate, and the remainder of the day, can practice strengthening her sphincter muscles to help control her stools.  She was advised to continue with Metamucil and a high-fiber diet, but at this point, can reduce or even remove MiraLAX from her regimen.  She was reminded that it will take some time for her to regain sphincter tone and control given the chronicity of her prolapse.  She does not need to return to the office for routine follow-up appointment, but can return to see me for any additional colorectal issues that may arise.\par \par 12/14/20 Ms. Almaraz presents to the office for a follow-up visit.  She has been noticing prolapse of an anterior hemorrhoidal column which has been leading to pink-tinged mucus discharge.  In office today, we proceeded to rubber band ligate this chronically prolapsing column to good effect.  I have noticed another fairly full column in the right anterior quadrant, and have recommended that we she return to the office for another ligation in approximately 2 to 3 weeks time.  She understands, and is agreeable.

## 2020-12-14 NOTE — HISTORY OF PRESENT ILLNESS
[FreeTextEntry1] : Ms. Almaraz presents to the office for a postoperative visit, accompanied by her .  Overall, she reports doing fairly well.  Her 's main complaint is that she uses the toilet excessively to try to evacuate her stools.  He states that it is up to 50 times a day.  She denies actually passing stools each of those attempts, but reports rectal pressure which then leads her to sit on the toilet bowl.  Her  is concerned that this will lead to a recurrence of her rectal prolapse.  In general, since the surgery, the stools have been soft and easy to pass.  She reports that the stool consistency was initially diarrheal, but has since improved and is now more formed.  She denies any recurrence of the prolapse.  There have been occasional accidents with stool incontinence but admittedly not very many.  She continues to use Metamucil and is also using MiraLAX twice daily.\par \par 12/14/20 Ms. Almaraz returns to the office accompanied by her  for a follow-up visit.  She states that she passes 1 good sized bowel movement bowel movement at least once daily and then throughout the course of the day, may additionally pass 3 or 4 small sized stools.  At night, she places a panty liner as she notices that in the morning, she will have some pink-tinged mucus discharge.  She is here to discuss this discharge.  Otherwise, no complaints of fecal incontinence or of constipation.

## 2020-12-14 NOTE — PHYSICAL EXAM
[Normal rectal exam] : exam was normal [Manually Reducible] : a manually reducible (grade III) [Normal] : was normal [None] : there was no rectal mass  [Gross Blood] : no gross blood [No Rash or Lesion] : No rash or lesion [Alert] : alert [Oriented to Person] : oriented to person [Oriented to Place] : oriented to place [Oriented to Time] : oriented to time [Calm] : calm [de-identified] : Incision clean dry and intact, soft, nontender nondistended [de-identified] : anterior [de-identified] : No apparent distress [de-identified] : Normocephalic atraumatic [de-identified] : Moving all extremities x4

## 2021-01-05 ENCOUNTER — APPOINTMENT (OUTPATIENT)
Dept: COLORECTAL SURGERY | Facility: CLINIC | Age: 76
End: 2021-01-05
Payer: MEDICARE

## 2021-01-05 VITALS
WEIGHT: 121 LBS | TEMPERATURE: 97.8 F | BODY MASS INDEX: 25.4 KG/M2 | HEIGHT: 58 IN | HEART RATE: 93 BPM | SYSTOLIC BLOOD PRESSURE: 180 MMHG | DIASTOLIC BLOOD PRESSURE: 85 MMHG

## 2021-01-05 DIAGNOSIS — K64.8 OTHER HEMORRHOIDS: ICD-10-CM

## 2021-01-05 PROCEDURE — 46221 LIGATION OF HEMORRHOID(S): CPT

## 2021-01-05 PROCEDURE — 99072 ADDL SUPL MATRL&STAF TM PHE: CPT

## 2021-01-05 PROCEDURE — 99214 OFFICE O/P EST MOD 30 MIN: CPT | Mod: 25

## 2021-01-05 NOTE — PHYSICAL EXAM
[Normal rectal exam] : exam was normal [Manually Reducible] : a manually reducible (grade III) [Normal] : was normal [None] : there was no rectal mass  [No Rash or Lesion] : No rash or lesion [Alert] : alert [Oriented to Person] : oriented to person [Oriented to Place] : oriented to place [Oriented to Time] : oriented to time [Calm] : calm [Gross Blood] : no gross blood [de-identified] : soft, nontender nondistended [de-identified] : left lateral [de-identified] : No apparent distress [de-identified] : Normocephalic atraumatic [de-identified] : Moving all extremities x4

## 2021-01-05 NOTE — ASSESSMENT
[FreeTextEntry1] : Ms. Almaraz returns to the office for a postoperative visit.  Overall, she is doing very well.  Her  is concerned that she is returning to her prior habits of sitting on the toilet excessively when there is no real stool to evacuate, and predisposing herself to a recurrence.  She admits to doing so, but reports that this is in response to a sensation of rectal pressure.  The rectal pressure that she is sensing is likely a consequence of chronic rectal prolapse as well as because of postoperative inflammation.  I agreed with her 's concern re: return to old habits  and have advised her to develop a new bowel regimen in which she can utilize the toilet in the morning, evacuate, and the remainder of the day, can practice strengthening her sphincter muscles to help control her stools.  She was advised to continue with Metamucil and a high-fiber diet, but at this point, can reduce or even remove MiraLAX from her regimen.  She was reminded that it will take some time for her to regain sphincter tone and control given the chronicity of her prolapse.  She does not need to return to the office for routine follow-up appointment, but can return to see me for any additional colorectal issues that may arise.\par \par 12/14/20 Ms. Almaraz presents to the office for a follow-up visit.  She has been noticing prolapse of an anterior hemorrhoidal column which has been leading to pink-tinged mucus discharge.  In office today, we proceeded to rubber band ligate this chronically prolapsing column to good effect.  I have noticed another fairly full column in the right anterior quadrant, and have recommended that we she return to the office for another ligation in approximately 2 to 3 weeks time.  She understands, and is agreeable.\par \par 1/5/21 Ms. Almaraz presents to the office for a followup visit. In office today, we proceeded to rubber band ligate the left lateral column to good effect. She was reassured and advised that rectal bleeding and sloughing would occur within the week. If continued issues, she will followup in office.

## 2021-01-05 NOTE — HISTORY OF PRESENT ILLNESS
[FreeTextEntry1] : Ms. Almaraz presents to the office for a postoperative visit, accompanied by her .  Overall, she reports doing fairly well.  Her 's main complaint is that she uses the toilet excessively to try to evacuate her stools.  He states that it is up to 50 times a day.  She denies actually passing stools each of those attempts, but reports rectal pressure which then leads her to sit on the toilet bowl.  Her  is concerned that this will lead to a recurrence of her rectal prolapse.  In general, since the surgery, the stools have been soft and easy to pass.  She reports that the stool consistency was initially diarrheal, but has since improved and is now more formed.  She denies any recurrence of the prolapse.  There have been occasional accidents with stool incontinence but admittedly not very many.  She continues to use Metamucil and is also using MiraLAX twice daily.\par \par 12/14/20 Ms. Almaraz returns to the office accompanied by her  for a follow-up visit.  She states that she passes 1 good sized bowel movement bowel movement at least once daily and then throughout the course of the day, may additionally pass 3 or 4 small sized stools.  At night, she places a panty liner as she notices that in the morning, she will have some pink-tinged mucus discharge.  She is here to discuss this discharge.  Otherwise, no complaints of fecal incontinence or of constipation.\par \par 1/5/21 Ms. Almaraz returns to the office for followup. She reports some improvement in mucosal prolapse since last office visit when she had undergone rubber band ligation. Continues to have some mucous discharge on underwear.

## 2021-03-17 NOTE — H&P PST ADULT - NSANTHAGERD_ENT_A_CORE
Pt NAD, slightly tachypneic, skin pink, warm and dry, afebrile, watching videos on iPad, father remains at bedside, denies any further needs, call light within reach.    Yes

## 2022-09-14 NOTE — H&P PST ADULT - GASTROINTESTINAL
Patient has decided for a D+C.  Covid pending.  IV Doxy ordered.  To OR when Covid results in.  Patient is aware and agrees.   details… Soft, non-tender, no hepatosplenomegaly, normal bowel sounds